# Patient Record
Sex: MALE | Race: BLACK OR AFRICAN AMERICAN
[De-identification: names, ages, dates, MRNs, and addresses within clinical notes are randomized per-mention and may not be internally consistent; named-entity substitution may affect disease eponyms.]

---

## 2017-09-06 PROBLEM — Z00.00 ENCOUNTER FOR PREVENTIVE HEALTH EXAMINATION: Status: ACTIVE | Noted: 2017-09-06

## 2017-09-18 ENCOUNTER — APPOINTMENT (OUTPATIENT)
Dept: GASTROENTEROLOGY | Facility: CLINIC | Age: 67
End: 2017-09-18
Payer: MEDICARE

## 2017-09-18 VITALS
WEIGHT: 161 LBS | TEMPERATURE: 98 F | HEIGHT: 69 IN | HEART RATE: 55 BPM | SYSTOLIC BLOOD PRESSURE: 130 MMHG | DIASTOLIC BLOOD PRESSURE: 70 MMHG | OXYGEN SATURATION: 98 % | BODY MASS INDEX: 23.85 KG/M2

## 2017-09-18 DIAGNOSIS — K59.00 CONSTIPATION, UNSPECIFIED: ICD-10-CM

## 2017-09-18 DIAGNOSIS — Z83.3 FAMILY HISTORY OF DIABETES MELLITUS: ICD-10-CM

## 2017-09-18 DIAGNOSIS — C61 MALIGNANT NEOPLASM OF PROSTATE: ICD-10-CM

## 2017-09-18 DIAGNOSIS — R93.8 ABNORMAL FINDINGS ON DIAGNOSTIC IMAGING OF OTHER SPECIFIED BODY STRUCTURES: ICD-10-CM

## 2017-09-18 DIAGNOSIS — Z80.42 FAMILY HISTORY OF MALIGNANT NEOPLASM OF PROSTATE: ICD-10-CM

## 2017-09-18 DIAGNOSIS — Z12.11 ENCOUNTER FOR SCREENING FOR MALIGNANT NEOPLASM OF COLON: ICD-10-CM

## 2017-09-18 PROCEDURE — 99204 OFFICE O/P NEW MOD 45 MIN: CPT

## 2017-10-03 ENCOUNTER — APPOINTMENT (OUTPATIENT)
Dept: GASTROENTEROLOGY | Facility: AMBULATORY MEDICAL SERVICES | Age: 67
End: 2017-10-03
Payer: MEDICARE

## 2017-10-03 PROCEDURE — 45380 COLONOSCOPY AND BIOPSY: CPT

## 2017-10-18 ENCOUNTER — APPOINTMENT (OUTPATIENT)
Dept: GASTROENTEROLOGY | Facility: CLINIC | Age: 67
End: 2017-10-18
Payer: MEDICARE

## 2017-10-18 VITALS
WEIGHT: 161 LBS | DIASTOLIC BLOOD PRESSURE: 82 MMHG | SYSTOLIC BLOOD PRESSURE: 130 MMHG | HEIGHT: 69 IN | BODY MASS INDEX: 23.85 KG/M2 | TEMPERATURE: 97.7 F

## 2017-10-18 DIAGNOSIS — K64.8 OTHER HEMORRHOIDS: ICD-10-CM

## 2017-10-18 PROCEDURE — 99213 OFFICE O/P EST LOW 20 MIN: CPT

## 2017-10-18 RX ORDER — SODIUM SULFATE, POTASSIUM SULFATE, MAGNESIUM SULFATE 17.5; 3.13; 1.6 G/ML; G/ML; G/ML
17.5-3.13-1.6 SOLUTION, CONCENTRATE ORAL
Qty: 1 | Refills: 0 | Status: DISCONTINUED | COMMUNITY
Start: 2017-09-18 | End: 2017-10-18

## 2020-11-10 ENCOUNTER — APPOINTMENT (OUTPATIENT)
Dept: GASTROENTEROLOGY | Facility: CLINIC | Age: 70
End: 2020-11-10
Payer: MEDICARE

## 2020-11-10 VITALS
HEIGHT: 69 IN | TEMPERATURE: 97.5 F | OXYGEN SATURATION: 97 % | WEIGHT: 164 LBS | DIASTOLIC BLOOD PRESSURE: 88 MMHG | SYSTOLIC BLOOD PRESSURE: 130 MMHG | HEART RATE: 78 BPM | BODY MASS INDEX: 24.29 KG/M2

## 2020-11-10 DIAGNOSIS — Z85.46 PERSONAL HISTORY OF MALIGNANT NEOPLASM OF PROSTATE: ICD-10-CM

## 2020-11-10 DIAGNOSIS — Z82.49 FAMILY HISTORY OF ISCHEMIC HEART DISEASE AND OTHER DISEASES OF THE CIRCULATORY SYSTEM: ICD-10-CM

## 2020-11-10 DIAGNOSIS — Z86.79 PERSONAL HISTORY OF OTHER DISEASES OF THE CIRCULATORY SYSTEM: ICD-10-CM

## 2020-11-10 DIAGNOSIS — D12.2 BENIGN NEOPLASM OF ASCENDING COLON: ICD-10-CM

## 2020-11-10 DIAGNOSIS — Z92.3 PERSONAL HISTORY OF IRRADIATION: ICD-10-CM

## 2020-11-10 PROCEDURE — 99072 ADDL SUPL MATRL&STAF TM PHE: CPT

## 2020-11-10 PROCEDURE — 99204 OFFICE O/P NEW MOD 45 MIN: CPT

## 2020-11-10 RX ORDER — CHOLECALCIFEROL (VITAMIN D3) 25 MCG
TABLET ORAL
Refills: 0 | Status: DISCONTINUED | COMMUNITY
End: 2020-11-10

## 2020-11-11 NOTE — CONSULT LETTER
[FreeTextEntry1] : Dear Dr. Johnathan Covarrubias and Dr. Epifanio Gracia,\par \[facility] I had the pleasure of seeing your patient EPIFANIO PEDERSEN in the office today.  My office note is attached. PLEASE READ THE "ASSESSMENT" SECTION OF THE NOTE TO SEE MY IMPRESSION AND PLAN.\par \[facility] Thank you very much for allowing me to participate in the care of your patient.\par \[facility] Sincerely,\[facility] \[facility] Zurdo Posadas M.D., FAC, [facility] Director, Celiac Program at [facility]  of Medicine\[facility] [employer] at [facility] Practice Director,\[facility] - Gastroenterology/Internal Medicine at [address]\[place], [place] [address] Tel: [phone]\[facility] Email: [email or web address] \[facility] \[facility] The attached note has been created using a voice recognition system (Dragon).  There may be some misspellings and typos.  Please call my office if you have any issues or questions.

## 2020-11-11 NOTE — HISTORY OF PRESENT ILLNESS
[FreeTextEntry1] : The patient is a 69-year-old man with a history of adenomatous colonic polyps.  He had prostate cancer treated with radiation in 2019.  He feels well denying abdominal pain, diarrhea, constipation.  He has 2 bowel movements a day without melena or bright red blood per rectum.  The patient denies heartburn or dysphagia.  The patient's weight is stable.  He has not been hospitalized in the past year.  The patient did report to me that he had some sort of rhythm problem but was not sure of the diagnosis or treatment plan.  I spent some time reviewing the patient's [place] chart to find out that the patient has been seeing a cardiologist Dr. Gracia and had Holter monitor showing episodes of bradycardia and pauses along with atrial fibrillation/flutter.  He was advised to be on anticoagulation or, at very least, on aspirin.  The patient is on neither and has not followed up.\par \par \par Note from 10/18/2017 - Patient is status post colonoscopy performed on October 3, 2017. Examination was significant for removal of a small adenoma from the ascending colon. Additionally, the patient has internal hemorrhoids which are asymptomatic. He feels well denying abdominal pain, heartburn, or dysphagia. He has 2-3 soft bowel movements a day without melena or bright red blood per rectum. His weight is stable.\par \par \par Note from 9/18/17 - The patient is a 66-year-old man who was just diagnosed with early prostate cancer and is to get radiation therapy. He had a bone scan and was told that he had a large amount of stool and air in his bowel. Also, one may try to empty his bowels to place the markers for radiation, he was full of stool. The patient reports one to 2 bowel movements a day without melena or bright blood per rectum. He denies abdominal pain, heartburn, or dysphagia. He has lost 10 pounds in the past year he reports eating better and exercising. The patient has not been hospitalized in the past year and denies any cardiac issues.

## 2020-11-11 NOTE — ASSESSMENT
[FreeTextEntry1] : Patient with a history of adenomatous colonic polyps.  While reviewing the patient's medical history, is apparent that the patient has significant cardiac issues which the patient is not following up on.\par \par I had a long discussion with the patient and his wife.  He will need a colonoscopy at some point but first we must have him return to see his cardiologist for further evaluation and treatment.  We will not be able to proceed with a colonoscopy until the patient is able to get cardiac clearance as well as anesthesia clearance.  At that time, a colonoscopy will be scheduled. The risks, benefits, alternatives, and limitations of the procedure, including the possibility of missed lesions, were explained.\par \par \par Plan from 10/18/2017 - Patient with a small adenomatous polyp.  Despite the finding of significant amount of stool on a CT scan, he moves his bowels regularly without any symptoms.\par \par We will repeat a colonoscopy in 3 years that is October 2020.\par \par \par Plan from 9/18/17 - Patient found to be full of stool on bone scan and after receiving laxatives for bowel cleanse. He does not reports symptoms of constipation. As he is about to begin radiation for prostate cancer, the other physicians involved in the patient's care would like a colonoscopy performed.\par \par A colonoscopy has been scheduled. The risks, benefits, alternatives, and limitations of the procedure, including the possibility of missed lesions, were explained.

## 2020-12-15 PROBLEM — Z12.11 ENCOUNTER FOR SCREENING FOR MALIGNANT NEOPLASM OF COLON: Status: RESOLVED | Noted: 2017-09-18 | Resolved: 2020-12-15

## 2020-12-23 DIAGNOSIS — Z01.818 ENCOUNTER FOR OTHER PREPROCEDURAL EXAMINATION: ICD-10-CM

## 2020-12-23 DIAGNOSIS — Z20.828 CONTACT WITH AND (SUSPECTED) EXPOSURE TO OTHER VIRAL COMMUNICABLE DISEASES: ICD-10-CM

## 2021-01-26 ENCOUNTER — APPOINTMENT (OUTPATIENT)
Dept: GASTROENTEROLOGY | Facility: AMBULATORY MEDICAL SERVICES | Age: 71
End: 2021-01-26
Payer: MEDICARE

## 2021-01-26 PROCEDURE — 45380 COLONOSCOPY AND BIOPSY: CPT

## 2021-02-23 ENCOUNTER — APPOINTMENT (OUTPATIENT)
Dept: GASTROENTEROLOGY | Facility: CLINIC | Age: 71
End: 2021-02-23
Payer: MEDICARE

## 2021-02-23 VITALS
BODY MASS INDEX: 23.99 KG/M2 | DIASTOLIC BLOOD PRESSURE: 80 MMHG | TEMPERATURE: 97.3 F | WEIGHT: 162 LBS | SYSTOLIC BLOOD PRESSURE: 130 MMHG | HEIGHT: 69 IN

## 2021-02-23 DIAGNOSIS — K64.4 RESIDUAL HEMORRHOIDAL SKIN TAGS: ICD-10-CM

## 2021-02-23 DIAGNOSIS — Z86.010 PERSONAL HISTORY OF COLONIC POLYPS: ICD-10-CM

## 2021-02-23 PROCEDURE — 99072 ADDL SUPL MATRL&STAF TM PHE: CPT

## 2021-02-23 PROCEDURE — 99213 OFFICE O/P EST LOW 20 MIN: CPT

## 2021-02-23 RX ORDER — SODIUM SULFATE, POTASSIUM SULFATE, MAGNESIUM SULFATE 17.5; 3.13; 1.6 G/ML; G/ML; G/ML
17.5-3.13-1.6 SOLUTION, CONCENTRATE ORAL
Qty: 1 | Refills: 0 | Status: DISCONTINUED | COMMUNITY
Start: 2020-11-10 | End: 2021-02-23

## 2021-02-23 NOTE — PHYSICAL EXAM
[General Appearance - Alert] : alert [General Appearance - In No Acute Distress] : in no acute distress [Neck Appearance] : the appearance of the neck was normal [Neck Cervical Mass (___cm)] : no neck mass was observed [Jugular Venous Distention Increased] : there was no jugular-venous distention [Thyroid Diffuse Enlargement] : the thyroid was not enlarged [Thyroid Nodule] : there were no palpable thyroid nodules [Auscultation Breath Sounds / Voice Sounds] : lungs were clear to auscultation bilaterally [Heart Rate And Rhythm] : heart rate was normal and rhythm regular [Heart Sounds Gallop] : no gallops [Heart Sounds] : normal S1 and S2 [Murmurs] : no murmurs [Heart Sounds Pericardial Friction Rub] : no pericardial rub [Edema] : there was no peripheral edema [Bowel Sounds] : normal bowel sounds [Abdomen Soft] : soft [Abdomen Tenderness] : non-tender [] : no hepato-splenomegaly [Abdomen Mass (___ Cm)] : no abdominal mass palpated [No CVA Tenderness] : no ~M costovertebral angle tenderness [No Spinal Tenderness] : no spinal tenderness [Oriented To Time, Place, And Person] : oriented to person, place, and time [Impaired Insight] : insight and judgment were intact [Affect] : the affect was normal

## 2021-02-24 NOTE — CONSULT LETTER
[FreeTextEntry1] : Dear Dr. Johnathan Covarrubias and Dr. Epifanio Gracia,\par \[facility] I had the pleasure of seeing your patient EPIFANIO PEDERSEN in the office today.  My office note is attached. PLEASE READ THE "ASSESSMENT" SECTION OF THE NOTE TO SEE MY IMPRESSION AND PLAN.\par \[facility] Thank you very much for allowing me to participate in the care of your patient.\par \[facility] Sincerely,\[facility] \[facility] Zurdo Posadas M.D., FAC, [facility] Director, C[facility]ac Program at [facility]  of Medicine\[facility] [employer] at [facility] Practice Director,\[facility] - Gastroenterology/Internal Medicine at [address]\[place], [place] [address] Tel: [phone]\[facility] Email: [email or web address] \[facility] \[facility] The attached note has been created using a voice recognition system (Dragon).  There may be some misspellings and typos.  Please call my office if you have any issues or questions.

## 2021-02-24 NOTE — ASSESSMENT
[FreeTextEntry1] : Patient with adenomatous colonic polyps.\par \par We will repeat a colonoscopy in 3 years that is January 2024.\par \par \par Plan from 11/10/2020 - Patient with a history of adenomatous colonic polyps.  While reviewing the patient's medical history, is apparent that the patient has significant cardiac issues which the patient is not following up on.\par \par I had a long discussion with the patient and his wife.  He will need a colonoscopy at some point but first we must have him return to see his cardiologist for further evaluation and treatment.  We will not be able to proceed with a colonoscopy until the patient is able to get cardiac clearance as well as anesthesia clearance.  At that time, a colonoscopy will be scheduled. The risks, benefits, alternatives, and limitations of the procedure, including the possibility of missed lesions, were explained.\par \par \par Plan from 10/18/2017 - Patient with a small adenomatous polyp.  Despite the finding of significant amount of stool on a CT scan, he moves his bowels regularly without any symptoms.\par \par We will repeat a colonoscopy in 3 years that is October 2020.\par \par \par Plan from 9/18/17 - Patient found to be full of stool on bone scan and after receiving laxatives for bowel cleanse. He does not reports symptoms of constipation. As he is about to begin radiation for prostate cancer, the other physicians involved in the patient's care would like a colonoscopy performed.\par \par A colonoscopy has been scheduled. The risks, benefits, alternatives, and limitations of the procedure, including the possibility of missed lesions, were explained.

## 2021-02-24 NOTE — HISTORY OF PRESENT ILLNESS
[FreeTextEntry1] : The patient is status post colonoscopy performed on January 26, 2021.  We reviewed the results.  6 small polyps were removed from the colon, 3 of which were tubular adenomas and one that was a sessile serrated adenoma.  The patient also has external hemorrhoids which are asymptomatic.  The patient has 2-3 bowel movements a day without diarrhea, constipation, melena, bright red blood per rectum, abdominal pain, heartburn, dysphagia.\par \par \par Note from 11/10/2020 - The patient is a 69-year-old man with a history of adenomatous colonic polyps.  He had prostate cancer treated with radiation in 2019.  He feels well denying abdominal pain, diarrhea, constipation.  He has 2 bowel movements a day without melena or bright red blood per rectum.  The patient denies heartburn or dysphagia.  The patient's weight is stable.  He has not been hospitalized in the past year.  The patient did report to me that he had some sort of rhythm problem but was not sure of the diagnosis or treatment plan.  I spent some time reviewing the patient's [place] chart to find out that the patient has been seeing a cardiologist Dr. Gracia and had Holter monitor showing episodes of bradycardia and pauses along with atrial fibrillation/flutter.  He was advised to be on anticoagulation or, at very least, on aspirin.  The patient is on neither and has not followed up.\par \par \par Note from 10/18/2017 - Patient is status post colonoscopy performed on October 3, 2017. Examination was significant for removal of a small adenoma from the ascending colon. Additionally, the patient has internal hemorrhoids which are asymptomatic. He feels well denying abdominal pain, heartburn, or dysphagia. He has 2-3 soft bowel movements a day without melena or bright red blood per rectum. His weight is stable.\par \par \par Note from 9/18/17 - The patient is a 66-year-old man who was just diagnosed with early prostate cancer and is to get radiation therapy. He had a bone scan and was told that he had a large amount of stool and air in his bowel. Also, one may try to empty his bowels to place the markers for radiation, he was full of stool. The patient reports one to 2 bowel movements a day without melena or bright blood per rectum. He denies abdominal pain, heartburn, or dysphagia. He has lost 10 pounds in the past year he reports eating better and exercising. The patient has not been hospitalized in the past year and denies any cardiac issues.

## 2023-01-06 ENCOUNTER — APPOINTMENT (OUTPATIENT)
Dept: GASTROENTEROLOGY | Facility: CLINIC | Age: 73
End: 2023-01-06
Payer: MEDICARE

## 2023-01-06 VITALS
TEMPERATURE: 97.5 F | SYSTOLIC BLOOD PRESSURE: 144 MMHG | HEART RATE: 65 BPM | DIASTOLIC BLOOD PRESSURE: 86 MMHG | HEIGHT: 69 IN | BODY MASS INDEX: 25.03 KG/M2 | WEIGHT: 169 LBS | OXYGEN SATURATION: 99 %

## 2023-01-06 DIAGNOSIS — D12.6 BENIGN NEOPLASM OF COLON, UNSPECIFIED: ICD-10-CM

## 2023-01-06 DIAGNOSIS — Z86.010 PERSONAL HISTORY OF COLONIC POLYPS: ICD-10-CM

## 2023-01-06 PROCEDURE — 99213 OFFICE O/P EST LOW 20 MIN: CPT

## 2023-01-09 NOTE — ASSESSMENT
[FreeTextEntry1] : Patient with a history of adenomatous colonic polyps.  He is doing well.\par \par I advised the patient that he is not yet due for a colonoscopy.  He is due for this in January 2024.\par \par Patient will return to see me in 1 year.\par \par \par Plan from 2/23/2021 - Patient with adenomatous colonic polyps.\par \par We will repeat a colonoscopy in 3 years that is January 2024.\par \par \par Plan from 11/10/2020 - Patient with a history of adenomatous colonic polyps.  While reviewing the patient's medical history, is apparent that the patient has significant cardiac issues which the patient is not following up on.\par \par I had a long discussion with the patient and his wife.  He will need a colonoscopy at some point but first we must have him return to see his cardiologist for further evaluation and treatment.  We will not be able to proceed with a colonoscopy until the patient is able to get cardiac clearance as well as anesthesia clearance.  At that time, a colonoscopy will be scheduled. The risks, benefits, alternatives, and limitations of the procedure, including the possibility of missed lesions, were explained.\par \par \par Plan from 10/18/2017 - Patient with a small adenomatous polyp.  Despite the finding of significant amount of stool on a CT scan, he moves his bowels regularly without any symptoms.\par \par We will repeat a colonoscopy in 3 years that is October 2020.\par \par \par Plan from 9/18/17 - Patient found to be full of stool on bone scan and after receiving laxatives for bowel cleanse. He does not reports symptoms of constipation. As he is about to begin radiation for prostate cancer, the other physicians involved in the patient's care would like a colonoscopy performed.\par \par A colonoscopy has been scheduled. The risks, benefits, alternatives, and limitations of the procedure, including the possibility of missed lesions, were explained.

## 2023-01-09 NOTE — HISTORY OF PRESENT ILLNESS
[FreeTextEntry1] : The patient has a history of adenomatous colonic polyps.  His last colonoscopy was on January 26, 2021.  He feels well denying abdominal pain.  He reports 1-2 solid bowel movements a day without melena or bright red blood per rectum.  He gets infrequent mild heartburn.  He denies dysphagia, nausea, vomiting.  The patient's weight is stable.  The patient has not been hospitalized in the past year.\par \par \par Note from 2/23/2021 - The patient is status post colonoscopy performed on January 26, 2021.  We reviewed the results.  6 small polyps were removed from the colon, 3 of which were tubular adenomas and one that was a sessile serrated adenoma.  The patient also has external hemorrhoids which are asymptomatic.  The patient has 2-3 bowel movements a day without diarrhea, constipation, melena, bright red blood per rectum, abdominal pain, heartburn, dysphagia.\par \par \par Note from 11/10/2020 - The patient is a 69-year-old man with a history of adenomatous colonic polyps.  He had prostate cancer treated with radiation in 2019.  He feels well denying abdominal pain, diarrhea, constipation.  He has 2 bowel movements a day without melena or bright red blood per rectum.  The patient denies heartburn or dysphagia.  The patient's weight is stable.  He has not been hospitalized in the past year.  The patient did report to me that he had some sort of rhythm problem but was not sure of the diagnosis or treatment plan.  I spent some time reviewing the patient's [place] chart to find out that the patient has been seeing a cardiologist Dr. Gracia and had Holter monitor showing episodes of bradycardia and pauses along with atrial fibrillation/flutter.  He was advised to be on anticoagulation or, at very least, on aspirin.  The patient is on neither and has not followed up.\par \par \par Note from 10/18/2017 - Patient is status post colonoscopy performed on October 3, 2017. Examination was significant for removal of a small adenoma from the ascending colon. Additionally, the patient has internal hemorrhoids which are asymptomatic. He feels well denying abdominal pain, heartburn, or dysphagia. He has 2-3 soft bowel movements a day without melena or bright red blood per rectum. His weight is stable.\par \par \par Note from 9/18/17 - The patient is a 66-year-old man who was just diagnosed with early prostate cancer and is to get radiation therapy. He had a bone scan and was told that he had a large amount of stool and air in his bowel. Also, one may try to empty his bowels to place the markers for radiation, he was full of stool. The patient reports one to 2 bowel movements a day without melena or bright blood per rectum. He denies abdominal pain, heartburn, or dysphagia. He has lost 10 pounds in the past year he reports eating better and exercising. The patient has not been hospitalized in the past year and denies any cardiac issues.

## 2023-01-09 NOTE — CONSULT LETTER
[FreeTextEntry1] : Dear Dr. Johnathan Covarrubias and Dr. Epifanio Gracia,\par \par I had the pleasure of seeing your patient EPIFANIO PEDERSEN in the office today.  My office note is attached. PLEASE READ THE "ASSESSMENT" SECTION OF THE NOTE TO SEE MY IMPRESSION AND PLAN.\par \par Thank you very much for allowing me to participate in the care of your patient.\par \par Sincerely,\par \par Zurdo Posadas M.D., FAC, FACP\par Director, Celiac Program at [facility]\par  of Medicine, [employer] [facility] [employer] at [facility] Adjunct  of Medicine, [facility] Practice Director, [facility] - Gastroenterology at [place]\[facility] [address]\[place] [address]\par Tel: [phone]\par Email: [email or web address]\par \par \par The attached note has been created using a voice recognition system (Dragon).  There may be some misspellings and typos.  Please call my office if you have any issues or questions.

## 2023-10-01 PROBLEM — Z92.3 HISTORY OF RADIATION THERAPY: Status: RESOLVED | Noted: 2020-11-10 | Resolved: 2023-10-01

## 2024-01-30 RX ORDER — SODIUM PICOSULFATE, MAGNESIUM OXIDE, AND ANHYDROUS CITRIC ACID 12; 3.5; 1 G/175ML; G/175ML; MG/175ML
10-3.5-12 MG-GM LIQUID ORAL
Qty: 2 | Refills: 0 | Status: ACTIVE | COMMUNITY
Start: 2024-01-30 | End: 1900-01-01

## 2024-04-01 NOTE — REVIEW OF SYSTEMS
Show Applicator Variable?: Yes Render Note In Bullet Format When Appropriate: No Duration Of Freeze Thaw-Cycle (Seconds): 10 Post-Care Instructions: I reviewed with the patient in detail post-care instructions. Patient is to wear sunprotection, and avoid picking at any of the treated lesions. Pt may apply Vaseline to crusted or scabbing areas. Consent: The patient's consent was obtained including but not limited to risks of crusting, scabbing, blistering, scarring, darker or lighter pigmentary change, recurrence, incomplete removal and infection. Number Of Freeze-Thaw Cycles: 1 freeze-thaw cycle Detail Level: Zone [As Noted in HPI] : as noted in HPI [Negative] : Heme/Lymph

## 2024-04-30 RX ORDER — SODIUM SULFATE, POTASSIUM SULFATE AND MAGNESIUM SULFATE 1.6; 3.13; 17.5 G/177ML; G/177ML; G/177ML
17.5-3.13-1.6 SOLUTION ORAL
Qty: 1 | Refills: 0 | Status: ACTIVE | COMMUNITY
Start: 2024-01-30 | End: 1900-01-01

## 2024-05-08 ENCOUNTER — APPOINTMENT (OUTPATIENT)
Dept: GASTROENTEROLOGY | Facility: AMBULATORY MEDICAL SERVICES | Age: 74
End: 2024-05-08
Payer: MEDICARE

## 2024-05-08 PROCEDURE — 45385 COLONOSCOPY W/LESION REMOVAL: CPT | Mod: PT

## 2024-05-08 PROCEDURE — 45380 COLONOSCOPY AND BIOPSY: CPT | Mod: PT,59

## 2024-05-20 ENCOUNTER — NON-APPOINTMENT (OUTPATIENT)
Age: 74
End: 2024-05-20